# Patient Record
Sex: MALE | Race: BLACK OR AFRICAN AMERICAN | NOT HISPANIC OR LATINO
[De-identification: names, ages, dates, MRNs, and addresses within clinical notes are randomized per-mention and may not be internally consistent; named-entity substitution may affect disease eponyms.]

---

## 2019-03-29 ENCOUNTER — APPOINTMENT (OUTPATIENT)
Dept: ORTHOPEDIC SURGERY | Facility: CLINIC | Age: 29
End: 2019-03-29
Payer: COMMERCIAL

## 2019-03-29 VITALS — HEIGHT: 71 IN | WEIGHT: 180 LBS | BODY MASS INDEX: 25.2 KG/M2

## 2019-03-29 DIAGNOSIS — Q68.2 CONGENITAL DEFORMITY OF KNEE: ICD-10-CM

## 2019-03-29 PROCEDURE — 99203 OFFICE O/P NEW LOW 30 MIN: CPT

## 2019-03-29 PROCEDURE — 73564 X-RAY EXAM KNEE 4 OR MORE: CPT | Mod: LT

## 2019-03-29 NOTE — HISTORY OF PRESENT ILLNESS
[de-identified] : 28 year old male presents for an evaluation of bilateral knee pain, he cannot attribute his pain to any specific injury or event. Patient notes that he is an avid  and that he has begun experiencing pain about his knees causing him to cut back on playing basketball to only 1 time per week. Today he rates his pain a 3/10 and describes it as an intermittent pain and swelling about his knees that is exacerbated with running and jumping. He says that for the past 5 years he has been utilizing knee sleeves while playing basketball and reports that they have helped with his symptoms until recently. He has no other complaints at this time.

## 2019-03-29 NOTE — END OF VISIT
[FreeTextEntry3] : All medical record entries made by the Shanaibe were at my, Dr. Romulo Zamorano, direction and personally dictated by me on 03/29/2019. I have reviewed the chart and agree that the record accurately reflects my personal performance of the history, physical exam, assessment and plan. I have also personally directed, reviewed, and agreed with the chart.

## 2019-03-29 NOTE — PHYSICAL EXAM
[Normal RLE] : Right Lower Extremity: No scars, rashes, lesions, ulcers, skin intact [Normal LLE] : Left Lower Extremity: No scars, rashes, lesions, ulcers, skin intact [Normal Touch] : sensation intact for touch [Normal] : No swelling, no edema, normal pedal pulses and normal temperature [Poor Appearance] : well-appearing [Acute Distress] : not in acute distress [Obese] : not obese [de-identified] : Right Lower Extremity\par o Knee :\par ¦ Inspection/Palpation : no tenderness to palpation along the joint lines, no swelling, no deformity \par ¦ Range of Motion : 5 degrees recurvatum - 130 degrees, no crepitus. Popliteal Angle: 40 degrees \par ¦ Stability : no valgus or varus instability present on provocative testing, Lachman’s Test (-)\par ¦ Strength : flexion and extension 5/5, abductor strength 5/5 \par o Muscle Bulk : normal muscle bulk present\par o Skin : no erythema, no ecchymosis \par o Sensation : sensation to pin intact\par o Vascular Exam : no edema, no cyanosis, dorsalis pedis artery pulse 2+, posterior tibial artery pulse 2+ \par \par Left Lower Extremity\par o Knee :\par ¦ Inspection/Palpation : no tenderness to palpation along the joint lines, no swelling, no deformity \par ¦ Range of Motion : 0 - 130 degrees, no crepitus. Popliteal Angle: 40 degrees.\par ¦ Stability : no valgus or varus instability present on provocative testing, Lachman’s Test (-)\par ¦ Strength : flexion and extension 5/5, abductor strength 5/5 \par o Muscle Bulk : normal muscle bulk present\par o Skin : no erythema, no ecchymosis \par o Sensation : sensation to pin intact\par o Vascular Exam : no edema, no cyanosis, dorsalis pedis artery pulse 2+, posterior tibial artery pulse 2+  [de-identified] : o Right Knee : AP, lateral, sunrise, and Bullock views of the knee were obtained, there are no soft tissue abnormalities, no fractures, Patella Tracy, normal appearing joint spaces, normal bone density, no bony lesions. \par \par o Left Knee : AP, lateral, sunrise, and Bullock views of the knee were obtained, there are no soft tissue abnormalities, no fractures, Patella Tracy, normal appearing joint spaces, normal bone density, no bony lesions.

## 2019-03-29 NOTE — ADDENDUM
[FreeTextEntry1] : I, Jessica Stafford, acted solely as a scribe for Dr. Romulo Zamorano on this date 03/29/2019.

## 2019-03-29 NOTE — DISCUSSION/SUMMARY
[de-identified] : The underlying pathophysiology was reviewed in great detail with the patient as well as the various treatment options, including ice, analgesics, NSAIDs, Physical therapy, steroid injections.\par \par A prescription for Physical Therapy was provided. \par  \par I have recommended utilizing a knee sleeves or Cho-Pat straps when playing basketball.\par \par I suggest utilizing Advil prior to playing basketball and applying ice to his knees after he plays. \par \par FU PRN.

## 2019-10-14 ENCOUNTER — APPOINTMENT (OUTPATIENT)
Dept: ORTHOPEDIC SURGERY | Facility: CLINIC | Age: 29
End: 2019-10-14
Payer: COMMERCIAL

## 2019-10-14 DIAGNOSIS — S46.211A STRAIN OF MUSCLE, FASCIA AND TENDON OF OTHER PARTS OF BICEPS, RIGHT ARM, INITIAL ENCOUNTER: ICD-10-CM

## 2019-10-14 PROCEDURE — 73080 X-RAY EXAM OF ELBOW: CPT | Mod: RT

## 2019-10-14 PROCEDURE — 99214 OFFICE O/P EST MOD 30 MIN: CPT

## 2019-10-14 NOTE — PHYSICAL EXAM
[Normal RUE] : Right Upper Extremity: No scars, rashes, lesions, ulcers, skin intact [Normal Touch] : sensation intact for touch [Normal] : No swelling, no edema, normal pedal pulses and normal temperature [Poor Appearance] : well-appearing [Acute Distress] : not in acute distress [de-identified] : Right Upper Extremity\par o Elbow :\par ¦ Inspection/Palpation : no tenderness over the medial or lateral epicondyles or over the distal biceps, mild tenderness over the lateral ulnohumeral joint line, no swelling, no deformities\par ¦ Range of Motion :  0 - 130 degrees, full pronation and supination to 90 degrees, no pain with resisted wrist flexion, extension, pronation or supination, no crepitus\par ¦ Strength : flexion and extension 5/5\par ¦ Stability : no joint instability on provocative testing\par ¦ Tests and Signs: Hook Test (-)\par o Muscle Bulk : no atrophy\par o Sensation : sensation intact to light touch\par o Skin : no skin lesions, no discoloration\par o Vascular Exam : no edema, no cyanosis, radial and ulnar pulses normal  [de-identified] : o Right Elbow : AP, lateral and oblique views were obtained, there are no soft tissue abnormalities, no fractures, alignment is normal, normal appearing joint spaces, normal bone density, no bony lesions.

## 2019-10-14 NOTE — END OF VISIT
[FreeTextEntry3] : All medical record entries made by the Scribe were at my, Dr. Romulo Zamorano, direction and personally dictated by me on 10/14/2019. I have reviewed the chart and agree that the record accurately reflects my personal performance of the history, physical exam, assessment and plan. I have also personally directed, reviewed, and agreed with the chart.

## 2019-10-14 NOTE — ADDENDUM
[FreeTextEntry1] : I, Katerina Cristina, acted solely as a scribe for Dr. Romulo Zamorano on this date 10/14/2019.

## 2019-10-14 NOTE — HISTORY OF PRESENT ILLNESS
[de-identified] : 29 year old male presents for an evaluation of right elbow pain that began recently and he cannot attribute his pain to any specific injury or event. He notes that his pain began after playing basketball. Today he describes an aching pain located along the anterior and posterior aspects of his right elbow that is intermittent in nature, as well as "tightness" about the posterior aspect of the elbow upon full extension. His symptoms are exacerbated with hyperextension of his right elbow, shooting a basketball, as well as with performing certain exercises including curls. The patient has no other complaints at this time.

## 2019-10-14 NOTE — DISCUSSION/SUMMARY
[de-identified] : The underlying pathophysiology was reviewed in great detail with the patient as well as the various treatment options, including ice, analgesics, NSAIDs, Physical therapy, stretching. \par \par A prescription for Physical Therapy was provided. \par \par A home exercise sheet was given and discussed with the patient to follow for left shoulder pain.\par \par Activity modifications and restrictions were discussed. \par \par An MRI of the right elbow was ordered to rule out partial biceps tear.\par \par FU after imaging is obtained.

## 2019-10-20 ENCOUNTER — FORM ENCOUNTER (OUTPATIENT)
Age: 29
End: 2019-10-20

## 2019-10-21 ENCOUNTER — APPOINTMENT (OUTPATIENT)
Dept: MRI IMAGING | Facility: CLINIC | Age: 29
End: 2019-10-21

## 2019-10-21 ENCOUNTER — OUTPATIENT (OUTPATIENT)
Dept: OUTPATIENT SERVICES | Facility: HOSPITAL | Age: 29
LOS: 1 days | End: 2019-10-21
Payer: COMMERCIAL

## 2019-10-21 DIAGNOSIS — S46.211A STRAIN OF MUSCLE, FASCIA AND TENDON OF OTHER PARTS OF BICEPS, RIGHT ARM, INITIAL ENCOUNTER: ICD-10-CM

## 2019-10-21 PROCEDURE — 73221 MRI JOINT UPR EXTREM W/O DYE: CPT | Mod: 26,RT

## 2019-10-21 PROCEDURE — 73221 MRI JOINT UPR EXTREM W/O DYE: CPT

## 2023-09-26 ENCOUNTER — APPOINTMENT (OUTPATIENT)
Dept: ORTHOPEDIC SURGERY | Facility: CLINIC | Age: 33
End: 2023-09-26
Payer: COMMERCIAL

## 2023-09-26 DIAGNOSIS — M76.52 PATELLAR TENDINITIS, RIGHT KNEE: ICD-10-CM

## 2023-09-26 DIAGNOSIS — M17.12 UNILATERAL PRIMARY OSTEOARTHRITIS, LEFT KNEE: ICD-10-CM

## 2023-09-26 DIAGNOSIS — M76.51 PATELLAR TENDINITIS, RIGHT KNEE: ICD-10-CM

## 2023-09-26 PROCEDURE — 73564 X-RAY EXAM KNEE 4 OR MORE: CPT | Mod: LT

## 2023-09-26 PROCEDURE — 99203 OFFICE O/P NEW LOW 30 MIN: CPT

## 2024-03-29 ENCOUNTER — TRANSCRIPTION ENCOUNTER (OUTPATIENT)
Age: 34
End: 2024-03-29

## 2024-03-30 ENCOUNTER — EMERGENCY (EMERGENCY)
Facility: HOSPITAL | Age: 34
LOS: 1 days | Discharge: HOME | End: 2024-03-30
Attending: STUDENT IN AN ORGANIZED HEALTH CARE EDUCATION/TRAINING PROGRAM | Admitting: STUDENT IN AN ORGANIZED HEALTH CARE EDUCATION/TRAINING PROGRAM
Payer: COMMERCIAL

## 2024-03-30 VITALS
HEIGHT: 71 IN | OXYGEN SATURATION: 99 % | DIASTOLIC BLOOD PRESSURE: 78 MMHG | TEMPERATURE: 98 F | HEART RATE: 80 BPM | SYSTOLIC BLOOD PRESSURE: 122 MMHG | RESPIRATION RATE: 17 BRPM | WEIGHT: 186.95 LBS

## 2024-03-30 PROCEDURE — 70486 CT MAXILLOFACIAL W/O DYE: CPT | Mod: MC

## 2024-03-30 PROCEDURE — 70486 CT MAXILLOFACIAL W/O DYE: CPT | Mod: 26,MC

## 2024-03-30 PROCEDURE — 99284 EMERGENCY DEPT VISIT MOD MDM: CPT

## 2024-03-30 PROCEDURE — 21320 CLSD TX NSL FX W/MNPJ&STABLJ: CPT | Mod: 50

## 2024-03-30 PROCEDURE — 99285 EMERGENCY DEPT VISIT HI MDM: CPT | Mod: 25

## 2024-03-30 RX ORDER — IBUPROFEN 200 MG
600 TABLET ORAL ONCE
Refills: 0 | Status: COMPLETED | OUTPATIENT
Start: 2024-03-30 | End: 2024-03-30

## 2024-03-30 RX ADMIN — Medication 1 TABLET(S): at 17:44

## 2024-03-30 RX ADMIN — Medication 600 MILLIGRAM(S): at 17:11

## 2024-03-30 NOTE — ED PROVIDER NOTE - NSICDXFAMILYHX_GEN_ALL_CORE_FT
FAMILY HISTORY:  Family history of prostate cancer    Mother  Still living? Yes, Estimated age: Age Unknown  Family history of hypertension, Age at diagnosis: Age Unknown

## 2024-03-30 NOTE — ED PROVIDER NOTE - PROGRESS NOTE DETAILS
Spoke with attending plastic surgeon, Dr. Escalante.  Requesting CT.  Will review CT and see patient in emergency room CAT scan results reviewed.  Dr. Escalante seen and evaluated patient emergency room.  Nasal fracture was reduced by Dr. Escalante and splint applied by Dr. Escalante.  Will follow-up in office.  Requesting 3 days of Augmentin.

## 2024-03-30 NOTE — ED PROVIDER NOTE - NSFOLLOWUPINSTRUCTIONS_ED_ALL_ED_FT
Tylenol over-the-counter for pain  Augmentin twice a day for 3 days  Call Monday morning to arrange appointment to be seen next week and Dr. Escalante's office  avoid any nose blowing or physical activity         Nasal Fracture  A fracture is a break in a bone. A nasal fracture is a broken nose. Minor breaks do not need treatment. They often heal on their own in about a month.    Serious breaks may need treatment. Sometimes, surgery is needed.    What are the causes?  This condition is usually caused by a direct hit to the nose. This often occurs from:  Playing a contact sport.  Being in a car accident.  Falling.  Getting punched in the face.  What are the signs or symptoms?  Pain.  Swelling of the nose.  Bleeding from the nose.  Bruises around the nose or eyes, including black eyes.  The nose having a crooked shape.  How is this treated?  Treatment depends on how bad the injury is.  Minor breaks may heal on their own. They often do not need treatment.  For more serious breaks that have caused bones to move out of position, treatment may include:  Numbing the nose area with medicines and moving the bones back into position without surgery. Your doctor may be able to do this in his or her office.  Surgery. If this is needed, it will be done after the swelling is gone.  Follow these instructions at home:  Activity    Return to your normal activities when your doctor says that it is safe.  Do not play contact sports for 3–4 weeks or as told by your doctor.  Managing pain and swelling    If told, put ice on the injured area. To do this:  Put ice in a plastic bag.  Place a towel between your skin and the bag.  Leave the ice on for 20 minutes, 2–3 times a day.  Take off the ice if your skin turns bright red. This is very important. If you cannot feel pain, heat, or cold, you have a greater risk of damage to the area.  General instructions    Bag of ice on a towel on the skin.   The correct and incorrect way to hold your head and fingers for first aid during a nosebleed.  Take over-the-counter and prescription medicines only as told by your doctor.  If your nose bleeds, sit up while you gently squeeze your nose shut for 10 minutes.  Try to not blow your nose.  Keep all follow-up visits.  Contact a doctor if:  You have more pain or very bad pain.  You keep having nosebleeds.  The shape of your nose does not return to normal after 5 days.  You have pus coming out of your nose.  Get help right away if:  Your nose bleeds for more than 20 minutes.  You have clear fluid draining out of your nose.  You have a swelling on the inside of your nose that does not get better.  You have trouble moving your eyes.  You keep vomiting.  These symptoms may be an emergency. Get help right away. Call 911.  Do not wait to see if the symptoms will go away.  Do not drive yourself to the hospital.  Summary  A nasal fracture is a broken nose.  Symptoms include pain, swelling, and bruising.  Minor breaks often do not require treatment. More serious breaks may require surgery or other treatments.  If your nose bleeds, sit up while you gently squeeze your nose shut for 10 minutes.  This information is not intended to replace advice given to you by your health care provider. Make sure you discuss any questions you have with your health care provider.

## 2024-03-30 NOTE — ED PROVIDER NOTE - DIFFERENTIAL DIAGNOSIS
Differentials include but not limited to nasal fracture, contusion.  No neurodeficits.  Do not suspect intracranial hemorrhage or skull fracture Differential Diagnosis

## 2024-03-30 NOTE — ED ADULT NURSE NOTE - OBJECTIVE STATEMENT
received pt in assigned area a&ox3 states while playing basketball he was elbowed directly to his nose, denies loc or falling back or hitting his head. + swelling/deformity to nose, skin intact, reps even & non labored, no other injuries noted on pt, medicated per MD orders, CT completed, plastic surgeon at bedside for eval.

## 2024-03-30 NOTE — ED PROVIDER NOTE - OBJECTIVE STATEMENT
33-year-old male with history of migraines presents with nasal injury and pain after injury prior to arrival.  Patient was playing basketball and elbowed into the nose.  Positive deformity to nose.  Was bleeding prior to arrival which has now resolved.  Pain moderate in nature.  Denies LOC.  Does not take any blood thinners.  Denies headache, dizziness, confusion, memory loss.  Denies other injuries or complaints 33-year-old male with history of migraines presents with nasal injury and pain after injury prior to arrival.  Patient was playing basketball and elbowed into the nose.  Positive deformity to nose.  Was bleeding prior to arrival which has now resolved.  Pain moderate in nature.  Denies LOC.  Does not take any blood thinners.  Denies headache, dizziness, confusion, memory loss.  Denies other injuries or complaints.

## 2024-03-30 NOTE — ED ADULT TRIAGE NOTE - CHIEF COMPLAINT QUOTE
34 y/o male presents axo4 ambulatory c/o nose pain/deformity sustained during a basketball game, pt reports he was elbowed by another player.

## 2024-03-30 NOTE — ED PROVIDER NOTE - NSICDXPASTMEDICALHX_GEN_ALL_CORE_FT
PAST MEDICAL HISTORY:  Migraine     Scrotal varices - varicocele     UTI (lower urinary tract infection) NOvember 2014 1 episode

## 2024-03-30 NOTE — ED PROVIDER NOTE - ATTENDING APP SHARED VISIT CONTRIBUTION OF CARE
Kerwin Barnes MD (Attending Physician):    I performed a history and physical exam of the patient and discussed their management with the VANESSA. I have reviewed the VANESSA note and agree with the documented findings and plan of care, except as noted. This was a shared visit with an VANESSA. I reviewed and verified the documentation and independently performed my own history/exam/medical decision making. My medical decision making and observations are found below. Please refer to any progress notes for updates on clinical course.    HPI:  33-year-old male with history of migraines presents with nasal injury and pain after injury prior to arrival.  Patient was playing basketball and elbowed into the nose.  Positive deformity to nose.  Was bleeding prior to arrival which has now resolved.  Pain moderate in nature.  Denies LOC.  Does not take any blood thinners.  Denies headache, dizziness, confusion, memory loss.  Denies other injuries or complaints.    PE:  GEN - +In mild discomfort, A&Ox3  HEAD - NC/AT  EYES - PERRL, EOMI  ENT - Airway patent, mucous membranes moist, oropharynx wnl. +Nasal tenderness with swelling and deformity that is TTP. Dried blood in b/l nares, but no obvious nasal septal hematoma.  PULMONARY - CTA b/l, symmetric breath sounds, no W/R/R  CARDIAC - +S1S2, RRR, no M/G/R, no JVD  EXTREMITIES - FROM, symmetric pulses, no edema  NEUROLOGIC - Alert, speech clear, no focal deficits, CN II-XII grossly intact, no pronator drift, normal gait, strength and sensation grossly intact, FTN negative b/l  PSYCH - Normal mood/affect, normal insight    MDM:  DDx includes, but not limited to: nasal fracture. Low suspicion for intracranial bleed or nasal septal hematoma. CT max-face, pain control, plastic surgery consult for possible reduction of nasal fracture. Dispo pending w/u.

## 2024-03-30 NOTE — ED ADULT NURSE NOTE - CHIEF COMPLAINT QUOTE
32 y/o male presents axo4 ambulatory c/o nose pain/deformity sustained during a basketball game, pt reports he was elbowed by another player.

## 2024-03-30 NOTE — ED PROVIDER NOTE - CARE PROVIDER_API CALL
Jeromy Winn  Plastic Surgery  [address] [place] [address], [address]  [place] [address]  Phone: [phone]  Fax: [phone]  Follow Up Time: 7-10 Days

## 2024-03-30 NOTE — ED PROVIDER NOTE - PATIENT PORTAL LINK FT
You can access the FollowMyHealth Patient Portal offered by [facility] by registering at the following website: [email or web address]. By joining [facility]’s FollowMyHealth portal, you will also be able to view your health information using other applications (apps) compatible with our system.

## 2025-01-21 NOTE — ED ADULT NURSE NOTE - NSFALLUNIVINTERV_ED_ALL_ED
Bed/Stretcher in lowest position, wheels locked, appropriate side rails in place/Call bell, personal items and telephone in reach/Instruct patient to call for assistance before getting out of bed/chair/stretcher/Non-slip footwear applied when patient is off stretcher/[place] to call system/Physically safe environment - no spills, clutter or unnecessary equipment/Purposeful proactive rounding/Room/bathroom lighting operational, light cord in reach
No - the patient is unable to be screened due to medical condition

## 2025-03-12 ENCOUNTER — OUTPATIENT (OUTPATIENT)
Dept: OUTPATIENT SERVICES | Facility: HOSPITAL | Age: 35
LOS: 1 days | End: 2025-03-12
Payer: COMMERCIAL

## 2025-03-12 ENCOUNTER — APPOINTMENT (OUTPATIENT)
Dept: RADIOLOGY | Facility: CLINIC | Age: 35
End: 2025-03-12
Payer: COMMERCIAL

## 2025-03-12 DIAGNOSIS — R05.2 SUBACUTE COUGH: ICD-10-CM

## 2025-03-12 PROCEDURE — 71046 X-RAY EXAM CHEST 2 VIEWS: CPT | Mod: 26

## 2025-03-12 PROCEDURE — 71046 X-RAY EXAM CHEST 2 VIEWS: CPT
